# Patient Record
Sex: MALE | Race: WHITE | NOT HISPANIC OR LATINO | URBAN - METROPOLITAN AREA
[De-identification: names, ages, dates, MRNs, and addresses within clinical notes are randomized per-mention and may not be internally consistent; named-entity substitution may affect disease eponyms.]

---

## 2023-10-03 ENCOUNTER — APPOINTMENT (RX ONLY)
Dept: URBAN - METROPOLITAN AREA CLINIC 310 | Facility: CLINIC | Age: 38
Setting detail: DERMATOLOGY
End: 2023-10-03

## 2023-10-03 DIAGNOSIS — D17 BENIGN LIPOMATOUS NEOPLASM: ICD-10-CM | Status: WORSENING

## 2023-10-03 PROBLEM — D17.22 BENIGN LIPOMATOUS NEOPLASM OF SKIN AND SUBCUTANEOUS TISSUE OF LEFT ARM: Status: ACTIVE | Noted: 2023-10-03

## 2023-10-03 PROCEDURE — ? DEFER

## 2023-10-03 PROCEDURE — ? COUNSELING

## 2023-10-03 PROCEDURE — 99202 OFFICE O/P NEW SF 15 MIN: CPT

## 2023-10-03 ASSESSMENT — LOCATION DETAILED DESCRIPTION DERM: LOCATION DETAILED: LEFT VENTRAL LATERAL DISTAL FOREARM

## 2023-10-03 ASSESSMENT — LOCATION ZONE DERM: LOCATION ZONE: ARM

## 2023-10-03 ASSESSMENT — LOCATION SIMPLE DESCRIPTION DERM: LOCATION SIMPLE: LEFT FOREARM

## 2023-10-03 NOTE — PROCEDURE: COUNSELING
Detail Level: Detailed
Patient Specific Counseling (Will Not Stick From Patient To Patient): . \\n\\n\\nPatient is experiencing pain with activities of daily living from this lipoma. It becomes easily inflamed with minor trauma.

## 2023-10-26 ENCOUNTER — APPOINTMENT (RX ONLY)
Dept: URBAN - METROPOLITAN AREA CLINIC 310 | Facility: CLINIC | Age: 38
Setting detail: DERMATOLOGY
End: 2023-10-26

## 2023-10-26 DIAGNOSIS — D17 BENIGN LIPOMATOUS NEOPLASM: ICD-10-CM | Status: WORSENING

## 2023-10-26 PROBLEM — D17.22 BENIGN LIPOMATOUS NEOPLASM OF SKIN AND SUBCUTANEOUS TISSUE OF LEFT ARM: Status: ACTIVE | Noted: 2023-10-26

## 2023-10-26 PROCEDURE — ? COUNSELING

## 2023-10-26 PROCEDURE — 11404 EXC TR-EXT B9+MARG 3.1-4 CM: CPT

## 2023-10-26 PROCEDURE — ? EXCISION

## 2023-10-26 ASSESSMENT — LOCATION SIMPLE DESCRIPTION DERM: LOCATION SIMPLE: LEFT FOREARM

## 2023-10-26 ASSESSMENT — LOCATION DETAILED DESCRIPTION DERM: LOCATION DETAILED: LEFT VENTRAL LATERAL DISTAL FOREARM

## 2023-10-26 ASSESSMENT — LOCATION ZONE DERM: LOCATION ZONE: ARM

## 2023-10-26 NOTE — PROCEDURE: EXCISION

## 2023-11-03 ENCOUNTER — APPOINTMENT (RX ONLY)
Dept: URBAN - METROPOLITAN AREA CLINIC 310 | Facility: CLINIC | Age: 38
Setting detail: DERMATOLOGY
End: 2023-11-03

## 2023-11-03 DIAGNOSIS — Z48.02 ENCOUNTER FOR REMOVAL OF SUTURES: ICD-10-CM | Status: STABLE

## 2023-11-03 PROCEDURE — 99024 POSTOP FOLLOW-UP VISIT: CPT

## 2023-11-03 PROCEDURE — ? SUTURE REMOVAL (GLOBAL PERIOD)

## 2023-11-03 ASSESSMENT — LOCATION SIMPLE DESCRIPTION DERM: LOCATION SIMPLE: LEFT FOREARM

## 2023-11-03 ASSESSMENT — LOCATION ZONE DERM: LOCATION ZONE: ARM

## 2023-11-03 ASSESSMENT — LOCATION DETAILED DESCRIPTION DERM: LOCATION DETAILED: LEFT VENTRAL LATERAL DISTAL FOREARM

## 2023-11-03 NOTE — PROCEDURE: SUTURE REMOVAL (GLOBAL PERIOD)
Detail Level: Detailed
Add 89220 Cpt? (Important Note: In 2017 The Use Of 78156 Is Being Tracked By Cms To Determine Future Global Period Reimbursement For Global Periods): yes

## 2023-12-14 ENCOUNTER — APPOINTMENT (RX ONLY)
Dept: URBAN - METROPOLITAN AREA CLINIC 310 | Facility: CLINIC | Age: 38
Setting detail: DERMATOLOGY
End: 2023-12-14

## 2023-12-14 DIAGNOSIS — D17 BENIGN LIPOMATOUS NEOPLASM: ICD-10-CM | Status: WORSENING

## 2023-12-14 PROBLEM — D17.21 BENIGN LIPOMATOUS NEOPLASM OF SKIN AND SUBCUTANEOUS TISSUE OF RIGHT ARM: Status: ACTIVE | Noted: 2023-12-14

## 2023-12-14 PROBLEM — D17.22 BENIGN LIPOMATOUS NEOPLASM OF SKIN AND SUBCUTANEOUS TISSUE OF LEFT ARM: Status: ACTIVE | Noted: 2023-12-14

## 2023-12-14 PROCEDURE — ? COUNSELING

## 2023-12-14 PROCEDURE — 99212 OFFICE O/P EST SF 10 MIN: CPT

## 2023-12-14 PROCEDURE — ? DEFER

## 2023-12-14 PROCEDURE — ? ADDITIONAL NOTES

## 2023-12-14 PROCEDURE — ? PHOTO-DOCUMENTATION

## 2023-12-14 ASSESSMENT — LOCATION SIMPLE DESCRIPTION DERM
LOCATION SIMPLE: RIGHT FOREARM
LOCATION SIMPLE: LEFT UPPER ARM

## 2023-12-14 ASSESSMENT — LOCATION ZONE DERM: LOCATION ZONE: ARM

## 2023-12-14 ASSESSMENT — LOCATION DETAILED DESCRIPTION DERM
LOCATION DETAILED: RIGHT VENTRAL PROXIMAL FOREARM
LOCATION DETAILED: LEFT DISTAL POSTERIOR UPPER ARM

## 2023-12-14 NOTE — PROCEDURE: DEFER
Introduction Text (Please End With A Colon): The following procedure was deferred: Patient wishes to have these treated with LN2 at a later date.
X Size Of Lesion In Cm (Optional): 0
Scheduling Instructions (Optional): refer to Dr. Cleary
Detail Level: Detailed

## 2023-12-14 NOTE — PROCEDURE: ADDITIONAL NOTES
Additional Notes: 1.5 cm or R arm\\n1cm on L arm
Render Risk Assessment In Note?: no
Detail Level: Simple